# Patient Record
Sex: MALE | Race: WHITE | NOT HISPANIC OR LATINO | Employment: OTHER | URBAN - METROPOLITAN AREA
[De-identification: names, ages, dates, MRNs, and addresses within clinical notes are randomized per-mention and may not be internally consistent; named-entity substitution may affect disease eponyms.]

---

## 2018-01-10 ENCOUNTER — OFFICE VISIT (OUTPATIENT)
Dept: FAMILY MEDICINE | Facility: CLINIC | Age: 63
End: 2018-01-10
Attending: FAMILY MEDICINE
Payer: MEDICARE

## 2018-01-10 VITALS
WEIGHT: 167.19 LBS | DIASTOLIC BLOOD PRESSURE: 80 MMHG | SYSTOLIC BLOOD PRESSURE: 138 MMHG | HEART RATE: 80 BPM | BODY MASS INDEX: 24.76 KG/M2 | OXYGEN SATURATION: 98 % | HEIGHT: 69 IN

## 2018-01-10 DIAGNOSIS — Z12.11 SCREENING FOR COLORECTAL CANCER: ICD-10-CM

## 2018-01-10 DIAGNOSIS — Z12.12 SCREENING FOR COLORECTAL CANCER: ICD-10-CM

## 2018-01-10 DIAGNOSIS — M96.1 POST LAMINECTOMY SYNDROME: ICD-10-CM

## 2018-01-10 DIAGNOSIS — Z00.00 LABORATORY EXAM ORDERED AS PART OF ROUTINE GENERAL MEDICAL EXAMINATION: ICD-10-CM

## 2018-01-10 DIAGNOSIS — I10 ESSENTIAL (PRIMARY) HYPERTENSION: ICD-10-CM

## 2018-01-10 DIAGNOSIS — Z12.5 PROSTATE CANCER SCREENING: ICD-10-CM

## 2018-01-10 DIAGNOSIS — Z11.59 NEED FOR HEPATITIS C SCREENING TEST: ICD-10-CM

## 2018-01-10 DIAGNOSIS — F90.9 ATTENTION DEFICIT HYPERACTIVITY DISORDER (ADHD), UNSPECIFIED ADHD TYPE: ICD-10-CM

## 2018-01-10 DIAGNOSIS — Z00.00 ROUTINE GENERAL MEDICAL EXAMINATION AT A HEALTH CARE FACILITY: Primary | ICD-10-CM

## 2018-01-10 DIAGNOSIS — L40.50 PSORIATIC ARTHRITIS: ICD-10-CM

## 2018-01-10 PROCEDURE — 99203 OFFICE O/P NEW LOW 30 MIN: CPT | Mod: PBBFAC,PO | Performed by: FAMILY MEDICINE

## 2018-01-10 PROCEDURE — 99999 PR PBB SHADOW E&M-NEW PATIENT-LVL III: CPT | Mod: PBBFAC,,, | Performed by: FAMILY MEDICINE

## 2018-01-10 PROCEDURE — 99386 PREV VISIT NEW AGE 40-64: CPT | Mod: S$PBB,,, | Performed by: FAMILY MEDICINE

## 2018-01-10 RX ORDER — CYCLOBENZAPRINE HCL 10 MG
10 TABLET ORAL
COMMUNITY
End: 2018-05-23

## 2018-01-10 RX ORDER — MORPHINE SULFATE 30 MG/1
30 TABLET, FILM COATED, EXTENDED RELEASE ORAL 2 TIMES DAILY
COMMUNITY

## 2018-01-10 RX ORDER — MORPHINE SULFATE 15 MG/1
15 TABLET, FILM COATED, EXTENDED RELEASE ORAL 2 TIMES DAILY
COMMUNITY

## 2018-01-10 NOTE — PROGRESS NOTES
Subjective:       Patient ID: Jarrett Butler is a 62 y.o. male.    Chief Complaint: Establish Care    HPI     The patient presents today for first visit with me in more than 20 years.  He is requesting a routine periodic health examination.      Patient Active Problem List   Diagnosis    Post laminectomy syndrome    Hypertension    ADHD (attention deficit hyperactivity disorder)    Psoriatic arthritis       Past Surgical History:   Procedure Laterality Date    INGUINAL HERNIA REPAIR Right     LUMBAR DISCECTOMY  1998    lower back (2)    LUMBAR EPIDURAL INJECTION      multiple    STRABISMUS SURGERY  1961         Current Outpatient Prescriptions:     cyclobenzaprine (FLEXERIL) 10 MG tablet, Take 10 mg by mouth as needed for Muscle spasms., Disp: , Rfl:     LISINOPRIL-HYDROCHLOROTHIAZIDE ORAL, Take 20 mg by mouth once daily., Disp: , Rfl:     morphine (MS CONTIN) 15 MG 12 hr tablet, Take 15 mg by mouth 2 (two) times daily., Disp: , Rfl:     morphine (MS CONTIN) 30 MG 12 hr tablet, Take 30 mg by mouth 2 (two) times daily., Disp: , Rfl:     Review of patient's allergies indicates:   Allergen Reactions    Penicillins      Rash (skin)^       Family History   Problem Relation Age of Onset    Urinary tract infection Mother     Cirrhosis Father        Social History     Social History    Marital status:      Spouse name: N/A    Number of children: 2    Years of education: N/A     Occupational History          disabled     Social History Main Topics    Smoking status: Current Every Day Smoker     Packs/day: 1.00     Years: 47.00     Types: Cigarettes     Start date: 1/22/1969    Smokeless tobacco: Never Used    Alcohol use No      Comment: no EtOH since early 90's    Drug use: Unknown    Sexual activity: Not on file     Other Topics Concern    Not on file     Social History Narrative    The patient does not exercise regularly ().      He is satisfied with weight.    Rates  diet as fair.    He does not drink at least 1/2 gallon water daily.    He drinks 2-3 coffee/tea/caffeine-containing soft drinks daily.    Total sleep time at night is 6-8 hours.    He does wear seat belts.    Hobbies include music.           Patient Care Team:  Maikel Brewer Jr., MD as PCP - General (Family Medicine)  Saad Garcia MD as Consulting Physician (Pain Medicine)      Review of Systems   Constitutional: Negative for fatigue and unexpected weight change.   HENT: Negative for ear discharge, ear pain, hearing loss, tinnitus and voice change.    Eyes: Negative for pain.   Respiratory: Negative for cough and shortness of breath.    Cardiovascular: Negative for chest pain, palpitations and leg swelling.   Gastrointestinal: Positive for constipation (2/t opioids). Negative for abdominal pain, blood in stool, diarrhea, nausea and vomiting.   Genitourinary: Positive for difficulty urinating (semse opf incomplete bladder emptying) and frequency. Negative for decreased urine volume, dysuria, enuresis, hematuria and urgency.   Musculoskeletal: Negative for arthralgias, back pain and myalgias.   Skin: Negative for rash.   Neurological: Negative for dizziness, weakness, light-headedness and headaches.   Hematological: Does not bruise/bleed easily.   Psychiatric/Behavioral: Positive for sleep disturbance (difficulty with induction on occasion). Negative for dysphoric mood. The patient is not nervous/anxious.          Objective:      Physical Exam   Constitutional: He is oriented to person, place, and time. He appears well-developed and well-nourished. He is cooperative.   HENT:   Head: Normocephalic and atraumatic.   Right Ear: External ear normal.   Left Ear: External ear normal.   Nose: Nose normal.   Mouth/Throat: Oropharynx is clear and moist and mucous membranes are normal. Abnormal dentition (multiple missing teeth). Dental caries present. No oropharyngeal exudate.   Eyes: Conjunctivae are normal. No scleral  icterus.   Neck: Neck supple. No JVD present. Carotid bruit is not present. No thyromegaly present.   Cardiovascular: Normal rate, regular rhythm, normal heart sounds and normal pulses.  Exam reveals no gallop and no friction rub.    No murmur heard.  Pulmonary/Chest: Effort normal and breath sounds normal. He has no wheezes. He has no rhonchi. He has no rales.   Abdominal: Soft. Bowel sounds are normal. He exhibits no distension and no mass. There is no splenomegaly or hepatomegaly. There is no tenderness.   Musculoskeletal: Normal range of motion. He exhibits no edema or tenderness.   Lymphadenopathy:     He has no cervical adenopathy.     He has no axillary adenopathy.   Neurological: He is alert and oriented to person, place, and time. He has normal strength and normal reflexes. No cranial nerve deficit or sensory deficit. Coordination normal.   Skin: Skin is warm and dry.   Psychiatric: He has a normal mood and affect.   Vitals reviewed.        Assessment:       1. Routine general medical examination at a health care facility    2. Essential (primary) hypertension     3. Attention deficit hyperactivity disorder (ADHD), unspecified ADHD type    4. Post laminectomy syndrome    5. Psoriatic arthritis    6. Need for hepatitis C screening test    7. Screening for colorectal cancer    8. Prostate cancer screening    9. Laboratory exam ordered as part of routine general medical examination        Plan:       Laboratory investigation, including diabetes and thyroid screening, serum chemistries, and lipid profile.  Discussed with patient the importance of lifestyle modifications, including well-balanced diet and moderate exercise regimen, in reducing risk for cardiovascular/cerebrovascular disease and diabetes.  Discussed routine examinations and screenings.   Declines all vaccines.   Agrees to FOBT.  Patient will continue to receive opioid Rxs from his pain management specialist in [place].    Assistance with smoking  "cessation was offered, including:  []  Medications  []  Counseling  [x]  Printed Information on Smoking Cessation  [x]  Referral to a Smoking Cessation Program    Patient was counseled regarding smoking for 3-10 minutes.    We will call the patient with results & make further recommendations at that time.                "This note will not be shared with the patient."  "

## 2018-01-10 NOTE — PATIENT INSTRUCTIONS
"    INSTRUCTIONS FOR [facility]    1.  [place] online at "[email or web address]"  OR       Call smoking cessation trust at [phone] or toll free at [phone].       They will register you and send you a special Trust Card    2.  After receiving the card, call [phone] to schedule an appointment with the Smoking Cessation Clinic.    "

## 2018-01-15 ENCOUNTER — LAB VISIT (OUTPATIENT)
Dept: LAB | Facility: HOSPITAL | Age: 63
End: 2018-01-15
Attending: FAMILY MEDICINE
Payer: MEDICARE

## 2018-01-15 ENCOUNTER — PATIENT MESSAGE (OUTPATIENT)
Dept: FAMILY MEDICINE | Facility: CLINIC | Age: 63
End: 2018-01-15

## 2018-01-15 DIAGNOSIS — R73.01 ELEVATED FASTING GLUCOSE: ICD-10-CM

## 2018-01-15 DIAGNOSIS — F90.9 ATTENTION DEFICIT HYPERACTIVITY DISORDER (ADHD), UNSPECIFIED ADHD TYPE: ICD-10-CM

## 2018-01-15 DIAGNOSIS — Z12.5 PROSTATE CANCER SCREENING: ICD-10-CM

## 2018-01-15 DIAGNOSIS — Z11.59 NEED FOR HEPATITIS C SCREENING TEST: ICD-10-CM

## 2018-01-15 DIAGNOSIS — I10 ESSENTIAL (PRIMARY) HYPERTENSION: ICD-10-CM

## 2018-01-15 DIAGNOSIS — Z00.00 LABORATORY EXAM ORDERED AS PART OF ROUTINE GENERAL MEDICAL EXAMINATION: ICD-10-CM

## 2018-01-15 LAB
ALBUMIN SERPL BCP-MCNC: 4.2 G/DL
ALP SERPL-CCNC: 44 U/L
ALT SERPL W/O P-5'-P-CCNC: 17 U/L
ANION GAP SERPL CALC-SCNC: 11 MMOL/L
AST SERPL-CCNC: 20 U/L
BILIRUB SERPL-MCNC: 0.9 MG/DL
BUN SERPL-MCNC: 21 MG/DL
CALCIUM SERPL-MCNC: 10.2 MG/DL
CHLORIDE SERPL-SCNC: 96 MMOL/L
CHOLEST SERPL-MCNC: 183 MG/DL
CHOLEST/HDLC SERPL: 4.9 {RATIO}
CO2 SERPL-SCNC: 29 MMOL/L
COMPLEXED PSA SERPL-MCNC: 1.2 NG/ML
CREAT SERPL-MCNC: 1.2 MG/DL
EST. GFR  (AFRICAN AMERICAN): >60 ML/MIN/1.73 M^2
EST. GFR  (NON AFRICAN AMERICAN): >60 ML/MIN/1.73 M^2
GLUCOSE SERPL-MCNC: 124 MG/DL
HCV AB SERPL QL IA: NEGATIVE
HDLC SERPL-MCNC: 37 MG/DL
HDLC SERPL: 20.2 %
LDLC SERPL CALC-MCNC: 122.2 MG/DL
NONHDLC SERPL-MCNC: 146 MG/DL
POTASSIUM SERPL-SCNC: 4.8 MMOL/L
PROT SERPL-MCNC: 7.5 G/DL
SODIUM SERPL-SCNC: 136 MMOL/L
TRIGL SERPL-MCNC: 119 MG/DL
TSH SERPL DL<=0.005 MIU/L-ACNC: 2.19 UIU/ML

## 2018-01-15 PROCEDURE — 86803 HEPATITIS C AB TEST: CPT

## 2018-01-15 PROCEDURE — 84443 ASSAY THYROID STIM HORMONE: CPT

## 2018-01-15 PROCEDURE — 80061 LIPID PANEL: CPT

## 2018-01-15 PROCEDURE — 36415 COLL VENOUS BLD VENIPUNCTURE: CPT | Mod: PO

## 2018-01-15 PROCEDURE — 80053 COMPREHEN METABOLIC PANEL: CPT

## 2018-01-15 PROCEDURE — 84153 ASSAY OF PSA TOTAL: CPT

## 2018-04-26 ENCOUNTER — PATIENT OUTREACH (OUTPATIENT)
Dept: ADMINISTRATIVE | Facility: HOSPITAL | Age: 63
End: 2018-04-26

## 2018-04-26 NOTE — PROGRESS NOTES
Contacted patient in efforts to encourage the completion and return of fitkit ( colon/rectal screening test) on 4/26/18. Will f/u at a later time.   LEFT MESSAGE FOR PATIENT TO CONTACT OFFICE. WILL MAIL LETTER IN EFFORTS TO REACH OUT TO PATIENT.

## 2018-04-26 NOTE — PROGRESS NOTES
Dear Jarrett Skaggs:    Your [facility] Team wants to make sure we help you prevent illness and make the healthiest choices possible.    Our records show you are due for colon cancer screening.  If you have already had your screening, or you have made an appointment for your screening, congratulations!  Youre on the road to good health. If you havent signed up for a colorectal screening please accept this invitation to be screened.      According to the American Cancer Society, colon cancer is the third most common cancer for people in the [place].  A simple screening test Fit Kit - done in your own home - can help find colon cancer at an early stage when it can be treated, even before any signs or symptoms develop.     Testing for blood in your stool (feces or bowel movement) is the first step. If you have an upcoming visit with your Primary Care Physician you can  a Fit Kit during your visit, or you can  a Fit Kit at your Primary Care Clinic prior to your visit.    The Fit Kit includes:     Instructions on how to perform the test   (1) Sheet of tissue paper   (1) Small Absorption pad   (1) Bottle to hold the sample and a small probe to help you take the sample   (1) Small plastic bio-hazard bag   (1) Postage-paid return envelope    Please do your test (the instructions will tell you how) and then return your sample in the postage-paid return envelope within 24 hours of collection.     A friendly reminder to please complete and mail in your fitkit for your colon rectal cancer screening. The kit was given to you on 1/10/18. If you have  misplaced kit or need instructions on completing  please contact Maikel Brewer Jr, MD office to obtain. Thank you for  Choosing [facility] for your Healthcare needs. Have a great day.     If your test results are negative, you wont need testing again for another year.  If results show you need more testing, we will call you with next  steps.    Regular colorectal cancer screening is one of the most powerful weapons for preventing colon cancer.  The website https://www.cancer.org/cancer/colon-rectal-cancer.html can answer many of your questions about this cancer and its prevention.  Just search for colorectal cancer.    I wish you continued good health!

## 2018-05-22 ENCOUNTER — PATIENT OUTREACH (OUTPATIENT)
Dept: ADMINISTRATIVE | Facility: HOSPITAL | Age: 63
End: 2018-05-22

## 2018-05-22 NOTE — PROGRESS NOTES
Dear Jarrett Butler        A friendly reminder to please complete and mail in your fitkit for your colon rectal cancer screening. The kit was given to you on 1/10/18. If you have  misplaced kit or need instructions on completing  please contact Maikel Brewer Jr, MD office to obtain. Thank you for  Choosing [facility] for your Healthcare needs. Have a great day.

## 2018-05-22 NOTE — PROGRESS NOTES
Spoke with patient regarding completing of colon rectal screening fit kit. He informed that he has been going through and a lot and simply forgets and that it is sitting on his desk. Understanding verbalized. Confirmed scheduled appointment on tomorrow.  Encourage completion of fit kit at his convenience did not have any questions or concerns at this time.

## 2018-05-23 ENCOUNTER — OFFICE VISIT (OUTPATIENT)
Dept: FAMILY MEDICINE | Facility: CLINIC | Age: 63
End: 2018-05-23
Attending: FAMILY MEDICINE
Payer: MEDICARE

## 2018-05-23 ENCOUNTER — LAB VISIT (OUTPATIENT)
Dept: LAB | Facility: HOSPITAL | Age: 63
End: 2018-05-23
Attending: FAMILY MEDICINE
Payer: MEDICARE

## 2018-05-23 ENCOUNTER — TELEPHONE (OUTPATIENT)
Dept: FAMILY MEDICINE | Facility: CLINIC | Age: 63
End: 2018-05-23

## 2018-05-23 VITALS
HEIGHT: 69 IN | SYSTOLIC BLOOD PRESSURE: 136 MMHG | WEIGHT: 151.13 LBS | DIASTOLIC BLOOD PRESSURE: 70 MMHG | OXYGEN SATURATION: 97 % | BODY MASS INDEX: 22.38 KG/M2 | HEART RATE: 63 BPM

## 2018-05-23 DIAGNOSIS — M96.1 POST LAMINECTOMY SYNDROME: ICD-10-CM

## 2018-05-23 DIAGNOSIS — I10 HYPERTENSION, UNSPECIFIED TYPE: ICD-10-CM

## 2018-05-23 DIAGNOSIS — R73.01 ELEVATED FASTING GLUCOSE: ICD-10-CM

## 2018-05-23 DIAGNOSIS — K59.03 THERAPEUTIC OPIOID INDUCED CONSTIPATION: ICD-10-CM

## 2018-05-23 DIAGNOSIS — F41.0 GENERALIZED ANXIETY DISORDER WITH PANIC ATTACKS: Primary | ICD-10-CM

## 2018-05-23 DIAGNOSIS — F41.1 GENERALIZED ANXIETY DISORDER WITH PANIC ATTACKS: Primary | ICD-10-CM

## 2018-05-23 DIAGNOSIS — T40.2X5A THERAPEUTIC OPIOID INDUCED CONSTIPATION: ICD-10-CM

## 2018-05-23 LAB
ANION GAP SERPL CALC-SCNC: 10 MMOL/L
BUN SERPL-MCNC: 8 MG/DL
CALCIUM SERPL-MCNC: 9.9 MG/DL
CHLORIDE SERPL-SCNC: 101 MMOL/L
CO2 SERPL-SCNC: 25 MMOL/L
CREAT SERPL-MCNC: 0.9 MG/DL
EST. GFR  (AFRICAN AMERICAN): >60 ML/MIN/1.73 M^2
EST. GFR  (NON AFRICAN AMERICAN): >60 ML/MIN/1.73 M^2
ESTIMATED AVG GLUCOSE: 108 MG/DL
GLUCOSE SERPL-MCNC: 99 MG/DL
HBA1C MFR BLD HPLC: 5.4 %
POTASSIUM SERPL-SCNC: 4.4 MMOL/L
SODIUM SERPL-SCNC: 136 MMOL/L

## 2018-05-23 PROCEDURE — 82947 ASSAY GLUCOSE BLOOD QUANT: CPT

## 2018-05-23 PROCEDURE — 83036 HEMOGLOBIN GLYCOSYLATED A1C: CPT

## 2018-05-23 PROCEDURE — 99999 PR PBB SHADOW E&M-EST. PATIENT-LVL III: CPT | Mod: PBBFAC,,, | Performed by: FAMILY MEDICINE

## 2018-05-23 PROCEDURE — 36415 COLL VENOUS BLD VENIPUNCTURE: CPT | Mod: PO

## 2018-05-23 PROCEDURE — 99214 OFFICE O/P EST MOD 30 MIN: CPT | Mod: S$PBB,,, | Performed by: FAMILY MEDICINE

## 2018-05-23 PROCEDURE — 99213 OFFICE O/P EST LOW 20 MIN: CPT | Mod: PBBFAC,PO | Performed by: FAMILY MEDICINE

## 2018-05-23 PROCEDURE — 80048 BASIC METABOLIC PNL TOTAL CA: CPT

## 2018-05-23 RX ORDER — ALPRAZOLAM 0.25 MG/1
0.25 TABLET ORAL 3 TIMES DAILY PRN
Qty: 10 TABLET | Refills: 0 | Status: SHIPPED | OUTPATIENT
Start: 2018-05-23 | End: 2018-06-14 | Stop reason: SDUPTHER

## 2018-05-23 RX ORDER — CITALOPRAM 10 MG/1
10 TABLET ORAL DAILY
Qty: 30 TABLET | Refills: 0 | Status: SHIPPED | OUTPATIENT
Start: 2018-05-23 | End: 2018-06-22 | Stop reason: SDUPTHER

## 2018-05-23 RX ORDER — LISINOPRIL AND HYDROCHLOROTHIAZIDE 12.5; 2 MG/1; MG/1
1 TABLET ORAL DAILY
Qty: 90 TABLET | Refills: 3 | Status: SHIPPED | OUTPATIENT
Start: 2018-05-23 | End: 2019-05-23

## 2018-05-23 NOTE — TELEPHONE ENCOUNTER
----- Message from Cheryl Dennison sent at 5/23/2018  3:54 PM CDT -----            Name of Who is Calling: Gwen([facility])      What is the request in detail: Rep states a Xanax rx was called in for the pt and she wants to know if he can take that with the morphine that he's on that was prescribed by another doctor. Please call to discuss.      Can the clinic reply by [facility]: no      What Number to Call Back if not in [facility]: [phone]

## 2018-05-23 NOTE — TELEPHONE ENCOUNTER
Spoke with the Gwen to inform her that Dr. Brewer prescribed the Xanax 0.25 mg as a trial. Dr. Brewer is aware of the medications the patient is on.

## 2018-05-24 LAB — GLUCOSE SERPL-MCNC: 99 MG/DL

## 2018-05-26 ENCOUNTER — PATIENT MESSAGE (OUTPATIENT)
Dept: FAMILY MEDICINE | Facility: CLINIC | Age: 63
End: 2018-05-26

## 2018-05-30 ENCOUNTER — LAB VISIT (OUTPATIENT)
Dept: LAB | Facility: HOSPITAL | Age: 63
End: 2018-05-30
Attending: FAMILY MEDICINE
Payer: MEDICARE

## 2018-05-30 ENCOUNTER — OFFICE VISIT (OUTPATIENT)
Dept: FAMILY MEDICINE | Facility: CLINIC | Age: 63
End: 2018-05-30
Attending: FAMILY MEDICINE
Payer: MEDICARE

## 2018-05-30 VITALS
SYSTOLIC BLOOD PRESSURE: 130 MMHG | HEART RATE: 77 BPM | BODY MASS INDEX: 22.02 KG/M2 | DIASTOLIC BLOOD PRESSURE: 70 MMHG | WEIGHT: 148.69 LBS | OXYGEN SATURATION: 97 % | HEIGHT: 69 IN

## 2018-05-30 DIAGNOSIS — T40.2X5A THERAPEUTIC OPIOID-INDUCED CONSTIPATION (OIC): ICD-10-CM

## 2018-05-30 DIAGNOSIS — E64.0 SEQUELAE OF PROTEIN-CALORIE MALNUTRITION: ICD-10-CM

## 2018-05-30 DIAGNOSIS — E55.9 VITAMIN D DEFICIENCY: ICD-10-CM

## 2018-05-30 DIAGNOSIS — F41.1 GENERALIZED ANXIETY DISORDER WITH PANIC ATTACKS: ICD-10-CM

## 2018-05-30 DIAGNOSIS — F41.0 GENERALIZED ANXIETY DISORDER WITH PANIC ATTACKS: ICD-10-CM

## 2018-05-30 DIAGNOSIS — K59.03 THERAPEUTIC OPIOID-INDUCED CONSTIPATION (OIC): ICD-10-CM

## 2018-05-30 DIAGNOSIS — R63.4 WEIGHT LOSS: ICD-10-CM

## 2018-05-30 DIAGNOSIS — R63.4 WEIGHT LOSS: Primary | ICD-10-CM

## 2018-05-30 LAB
25(OH)D3+25(OH)D2 SERPL-MCNC: 16 NG/ML
ALBUMIN SERPL BCP-MCNC: 4.7 G/DL
ALP SERPL-CCNC: 40 U/L
ALT SERPL W/O P-5'-P-CCNC: 10 U/L
ANION GAP SERPL CALC-SCNC: 9 MMOL/L
AST SERPL-CCNC: 15 U/L
BASOPHILS # BLD AUTO: 0.03 K/UL
BASOPHILS NFR BLD: 0.3 %
BILIRUB SERPL-MCNC: 1.2 MG/DL
BUN SERPL-MCNC: 14 MG/DL
CALCIUM SERPL-MCNC: 10.1 MG/DL
CHLORIDE SERPL-SCNC: 101 MMOL/L
CO2 SERPL-SCNC: 28 MMOL/L
CREAT SERPL-MCNC: 1 MG/DL
DIFFERENTIAL METHOD: ABNORMAL
EOSINOPHIL # BLD AUTO: 0 K/UL
EOSINOPHIL NFR BLD: 0.4 %
ERYTHROCYTE [DISTWIDTH] IN BLOOD BY AUTOMATED COUNT: 12.5 %
EST. GFR  (AFRICAN AMERICAN): >60 ML/MIN/1.73 M^2
EST. GFR  (NON AFRICAN AMERICAN): >60 ML/MIN/1.73 M^2
GLUCOSE SERPL-MCNC: 103 MG/DL
HCT VFR BLD AUTO: 48.5 %
HGB BLD-MCNC: 16.4 G/DL
IMM GRANULOCYTES # BLD AUTO: 0.02 K/UL
IMM GRANULOCYTES NFR BLD AUTO: 0.2 %
INR PPP: 1.1
LYMPHOCYTES # BLD AUTO: 1.2 K/UL
LYMPHOCYTES NFR BLD: 13.5 %
MCH RBC QN AUTO: 29.7 PG
MCHC RBC AUTO-ENTMCNC: 33.8 G/DL
MCV RBC AUTO: 88 FL
MONOCYTES # BLD AUTO: 0.6 K/UL
MONOCYTES NFR BLD: 6.4 %
NEUTROPHILS # BLD AUTO: 7.2 K/UL
NEUTROPHILS NFR BLD: 79.2 %
NRBC BLD-RTO: 0 /100 WBC
PLATELET # BLD AUTO: 260 K/UL
PMV BLD AUTO: 9.7 FL
POTASSIUM SERPL-SCNC: 4.9 MMOL/L
PREALB SERPL-MCNC: 30 MG/DL
PROT SERPL-MCNC: 7.5 G/DL
PROTHROMBIN TIME: 11.1 SEC
RBC # BLD AUTO: 5.52 M/UL
SODIUM SERPL-SCNC: 138 MMOL/L
T4 FREE SERPL-MCNC: 1.27 NG/DL
TSH SERPL DL<=0.005 MIU/L-ACNC: 1.14 UIU/ML
WBC # BLD AUTO: 9.11 K/UL

## 2018-05-30 PROCEDURE — 80053 COMPREHEN METABOLIC PANEL: CPT

## 2018-05-30 PROCEDURE — 84439 ASSAY OF FREE THYROXINE: CPT

## 2018-05-30 PROCEDURE — 99213 OFFICE O/P EST LOW 20 MIN: CPT | Mod: PBBFAC,PO | Performed by: FAMILY MEDICINE

## 2018-05-30 PROCEDURE — 85025 COMPLETE CBC W/AUTO DIFF WBC: CPT

## 2018-05-30 PROCEDURE — 99999 PR PBB SHADOW E&M-EST. PATIENT-LVL III: CPT | Mod: PBBFAC,,, | Performed by: FAMILY MEDICINE

## 2018-05-30 PROCEDURE — 84597 ASSAY OF VITAMIN K: CPT

## 2018-05-30 PROCEDURE — 85610 PROTHROMBIN TIME: CPT

## 2018-05-30 PROCEDURE — 84134 ASSAY OF PREALBUMIN: CPT

## 2018-05-30 PROCEDURE — 82306 VITAMIN D 25 HYDROXY: CPT

## 2018-05-30 PROCEDURE — 84443 ASSAY THYROID STIM HORMONE: CPT

## 2018-05-30 PROCEDURE — 84590 ASSAY OF VITAMIN A: CPT

## 2018-05-30 PROCEDURE — 99214 OFFICE O/P EST MOD 30 MIN: CPT | Mod: S$PBB,,, | Performed by: FAMILY MEDICINE

## 2018-05-30 PROCEDURE — 84446 ASSAY OF VITAMIN E: CPT

## 2018-05-30 PROCEDURE — 36415 COLL VENOUS BLD VENIPUNCTURE: CPT | Mod: PO

## 2018-05-30 NOTE — PROGRESS NOTES
Subjective:       Patient ID: Jarrett Butler is a 63 y.o. male.    Chief Complaint: GI Problem    GI Problem   The primary symptoms include weight loss, nausea and diarrhea (soft, semiformed). Primary symptoms do not include vomiting, melena, jaundice, hematochezia, myalgias or arthralgias. Episode onset: 3-4 weeks ago. The onset was gradual. The problem has not changed since onset.  He has lost 5 to10 kg (over past 4 months). There has been a change in the fit of his clothing.   The illness is also significant for constipation. The illness does not include chills. Associated medical issues do not include gallstones, gastric bypass, bowel resection or diverticulitis.       Patient Active Problem List   Diagnosis    Post laminectomy syndrome    Hypertension    ADHD (attention deficit hyperactivity disorder)    Psoriatic arthritis    Elevated fasting glucose    Generalized anxiety disorder with panic attacks    Therapeutic opioid induced constipation         Current Outpatient Prescriptions:     ALPRAZolam (XANAX) 0.25 MG tablet, Take 1 tablet (0.25 mg total) by mouth 3 (three) times daily as needed for Anxiety., Disp: 10 tablet, Rfl: 0    citalopram (CELEXA) 10 MG tablet, Take 1 tablet (10 mg total) by mouth once daily., Disp: 30 tablet, Rfl: 0    lisinopril-hydrochlorothiazide (PRINZIDE,ZESTORETIC) 20-12.5 mg per tablet, Take 1 tablet by mouth once daily., Disp: 90 tablet, Rfl: 3    morphine (MS CONTIN) 15 MG 12 hr tablet, Take 15 mg by mouth 2 (two) times daily., Disp: , Rfl:     morphine (MS CONTIN) 30 MG 12 hr tablet, Take 30 mg by mouth 2 (two) times daily., Disp: , Rfl:     The following portions of the patient's history were reviewed and updated as appropriate: allergies, past family history, past medical history, past social history and past surgical history    Review of Systems   Constitutional: Positive for weight loss. Negative for chills.   Gastrointestinal: Positive for constipation,  "diarrhea (soft, semiformed) and nausea. Negative for hematochezia, jaundice, melena and vomiting.   Musculoskeletal: Negative for arthralgias and myalgias.       Wife reports no improvement with anxiety.  Still awaiting word on EAP.    Objective:      Physical Exam   Constitutional: He is oriented to person, place, and time. He appears cachectic.   HENT:   Head: Normocephalic and atraumatic.   Mouth/Throat: Oropharynx is clear and moist.   Eyes: Conjunctivae are normal. No scleral icterus.   Cardiovascular: Normal rate, regular rhythm and normal heart sounds.  Exam reveals no gallop.    No murmur heard.  Pulmonary/Chest: Effort normal and breath sounds normal. He has no wheezes. He has no rales.   Abdominal: Soft. Bowel sounds are decreased. There is no tenderness.   Neurological: He is alert and oriented to person, place, and time.   Skin: Skin is warm and dry.   Psychiatric: He has a normal mood and affect.         Assessment:       1. Weight loss    2. Generalized anxiety disorder with panic attacks    3. Therapeutic opioid-induced constipation (OIC)          Plan:       Flat/erect abd film.  Labs (w/nutritional markers).  Consider [facility] day program ([facility] if EAP falls through).        "This note will not be shared with the patient."  "

## 2018-05-31 ENCOUNTER — PATIENT MESSAGE (OUTPATIENT)
Dept: FAMILY MEDICINE | Facility: CLINIC | Age: 63
End: 2018-05-31

## 2018-06-02 LAB
A-TOCOPHEROL VIT E SERPL-MCNC: 1022 UG/DL (ref 500–1800)
VIT A SERPL-MCNC: 54 UG/DL (ref 38–106)

## 2018-06-03 LAB — PHYTONADIONE SERPL-MCNC: 0.48 NMOL/L (ref 0.22–4.88)

## 2018-06-04 ENCOUNTER — PATIENT MESSAGE (OUTPATIENT)
Dept: FAMILY MEDICINE | Facility: CLINIC | Age: 63
End: 2018-06-04

## 2018-06-06 ENCOUNTER — PATIENT MESSAGE (OUTPATIENT)
Dept: FAMILY MEDICINE | Facility: CLINIC | Age: 63
End: 2018-06-06

## 2018-06-06 RX ORDER — VIT C/E/ZN/COPPR/LUTEIN/ZEAXAN 250MG-90MG
1000 CAPSULE ORAL DAILY
Refills: 0 | COMMUNITY
Start: 2018-06-06

## 2018-06-06 RX ORDER — ERGOCALCIFEROL 1.25 MG/1
50000 CAPSULE ORAL WEEKLY
Qty: 4 CAPSULE | Refills: 2 | Status: SHIPPED | OUTPATIENT
Start: 2018-06-06 | End: 2018-09-04

## 2018-06-14 ENCOUNTER — OFFICE VISIT (OUTPATIENT)
Dept: FAMILY MEDICINE | Facility: CLINIC | Age: 63
End: 2018-06-14
Attending: FAMILY MEDICINE
Payer: MEDICARE

## 2018-06-14 ENCOUNTER — DOCUMENTATION ONLY (OUTPATIENT)
Dept: PSYCHIATRY | Facility: HOSPITAL | Age: 63
End: 2018-06-14

## 2018-06-14 VITALS
HEIGHT: 69 IN | WEIGHT: 146.31 LBS | DIASTOLIC BLOOD PRESSURE: 60 MMHG | SYSTOLIC BLOOD PRESSURE: 120 MMHG | OXYGEN SATURATION: 97 % | HEART RATE: 63 BPM | BODY MASS INDEX: 21.67 KG/M2

## 2018-06-14 DIAGNOSIS — F41.0 GENERALIZED ANXIETY DISORDER WITH PANIC ATTACKS: Primary | ICD-10-CM

## 2018-06-14 DIAGNOSIS — F32.2 CURRENT SEVERE EPISODE OF MAJOR DEPRESSIVE DISORDER WITHOUT PSYCHOTIC FEATURES WITHOUT PRIOR EPISODE: ICD-10-CM

## 2018-06-14 DIAGNOSIS — F41.1 GENERALIZED ANXIETY DISORDER WITH PANIC ATTACKS: Primary | ICD-10-CM

## 2018-06-14 PROCEDURE — 99213 OFFICE O/P EST LOW 20 MIN: CPT | Mod: PBBFAC,PO | Performed by: FAMILY MEDICINE

## 2018-06-14 PROCEDURE — 99999 PR PBB SHADOW E&M-EST. PATIENT-LVL III: CPT | Mod: PBBFAC,,, | Performed by: FAMILY MEDICINE

## 2018-06-14 PROCEDURE — 99213 OFFICE O/P EST LOW 20 MIN: CPT | Mod: S$PBB,,, | Performed by: FAMILY MEDICINE

## 2018-06-14 RX ORDER — ALPRAZOLAM 0.25 MG/1
0.25 TABLET ORAL 3 TIMES DAILY PRN
Qty: 10 TABLET | Refills: 0 | Status: SHIPPED | OUTPATIENT
Start: 2018-06-14 | End: 2018-07-14

## 2018-06-14 NOTE — PROGRESS NOTES
"Subjective:       Patient ID: Jarrett Butler is a 63 y.o. male.    Chief Complaint: Panic Attack    HPI     The patient presents again with his wife.  He had a panic attack yesterday, which was incapacitating.  He is seeing a counselor (CBT); 3 sessions so far.  He has lost another 2 lbs since last week.  He told his wife this morning that he doesn't feel "in control of my brain."  He admits to suicidal thoughts without ideations.    Wife states that her first  (and patient's best friend) committed suicide.        Patient Active Problem List   Diagnosis    Post laminectomy syndrome    Hypertension    ADHD (attention deficit hyperactivity disorder)    Psoriatic arthritis    Elevated fasting glucose    Generalized anxiety disorder with panic attacks    Therapeutic opioid induced constipation    Current severe episode of major depressive disorder without prior episode       Current Outpatient Prescriptions:     ALPRAZolam (XANAX) 0.25 MG tablet, Take 1 tablet (0.25 mg total) by mouth 3 (three) times daily as needed for Anxiety., Disp: 10 tablet, Rfl: 0    cholecalciferol, vitamin D3, 1,000 unit capsule, Take 1 capsule (1,000 Units total) by mouth once daily., Disp: , Rfl: 0    citalopram (CELEXA) 10 MG tablet, Take 1 tablet (10 mg total) by mouth once daily., Disp: 30 tablet, Rfl: 0    ergocalciferol (ERGOCALCIFEROL) 50,000 unit Cap, Take 1 capsule (50,000 Units total) by mouth once a week., Disp: 4 capsule, Rfl: 2    lisinopril-hydrochlorothiazide (PRINZIDE,ZESTORETIC) 20-12.5 mg per tablet, Take 1 tablet by mouth once daily., Disp: 90 tablet, Rfl: 3    morphine (MS CONTIN) 15 MG 12 hr tablet, Take 15 mg by mouth 2 (two) times daily., Disp: , Rfl:     morphine (MS CONTIN) 30 MG 12 hr tablet, Take 30 mg by mouth 2 (two) times daily., Disp: , Rfl:     The following portions of the patient's history were reviewed and updated as appropriate: allergies, past family history, past medical " "history, past social history and past surgical history    Review of Systems    Other than history of present illness, noncontributory.    Objective:      Physical Exam    /60 (BP Location: Left arm, Patient Position: Sitting, BP Method: Small (Manual))   Pulse 63   Ht 5' 9" (1.753 m)   Wt 66.4 kg (146 lb 4.8 oz)   SpO2 97%   BMI 21.60 kg/m²      The entirety of today's visit was devoted to counseling.  The visit lasted 20 minutes.    Assessment:       1. Generalized anxiety disorder with panic attacks    2. 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  Agree to psychiatric evaluation.  Will arrange for evaluation in day program at [facility].    "This note will not be shared with the patient."  "

## 2018-06-14 NOTE — PSYCH
Pt called to inquire about BMU program. Pt expressed ambivalence to group therapy and requested to see psychiatrist. SW provided phone number to out pt psych. Pt noted phone number.

## 2018-06-18 ENCOUNTER — DOCUMENTATION ONLY (OUTPATIENT)
Dept: PSYCHIATRY | Facility: HOSPITAL | Age: 63
End: 2018-06-18

## 2018-06-18 NOTE — PSYCH
[employer] contacted pt to review insurance benefits and schedule BMU start date. Pt wanted to pend scheduling start date for BMU indicating need to discuss insurance coverage and payment with spouse. Pt expressed understanding of coverage and elected to hold off BMU scheduling for now. Pt reported that he will contact [employer] back.

## 2018-06-21 ENCOUNTER — PATIENT MESSAGE (OUTPATIENT)
Dept: FAMILY MEDICINE | Facility: CLINIC | Age: 63
End: 2018-06-21

## 2018-06-22 ENCOUNTER — PATIENT MESSAGE (OUTPATIENT)
Dept: FAMILY MEDICINE | Facility: CLINIC | Age: 63
End: 2018-06-22

## 2018-06-22 RX ORDER — CITALOPRAM 20 MG/1
20 TABLET, FILM COATED ORAL DAILY
Qty: 30 TABLET | Refills: 0 | Status: SHIPPED | OUTPATIENT
Start: 2018-06-22

## 2018-08-13 ENCOUNTER — PATIENT OUTREACH (OUTPATIENT)
Dept: ADMINISTRATIVE | Facility: HOSPITAL | Age: 63
End: 2018-08-13

## 2018-08-13 ENCOUNTER — PATIENT MESSAGE (OUTPATIENT)
Dept: ADMINISTRATIVE | Facility: HOSPITAL | Age: 63
End: 2018-08-13

## 2018-08-13 NOTE — PROGRESS NOTES
Dear Jarrett Butler        A friendly reminder to please complete and mail in your fitkit for your colon rectal cancer screening. The kit was given to you on 1.10.18. If you have misplaced the kit or need instructions on completing  please contact Maikel Brewer Jr, MD office to obtain assistance.  Please return kit to your nearest [facility] facility once completed. You also have the option to mail in the kit once completed. Thank you for  Choosing [facility] for your Healthcare needs. Have a great day.

## 2019-03-15 DIAGNOSIS — Z12.11 COLON CANCER SCREENING: ICD-10-CM

## 2020-05-14 DIAGNOSIS — Z12.11 COLON CANCER SCREENING: ICD-10-CM

## 2020-09-29 ENCOUNTER — PATIENT MESSAGE (OUTPATIENT)
Dept: OTHER | Facility: OTHER | Age: 65
End: 2020-09-29

## 2020-10-05 ENCOUNTER — PATIENT MESSAGE (OUTPATIENT)
Dept: INTERNAL MEDICINE | Facility: CLINIC | Age: 65
End: 2020-10-05

## 2020-12-11 ENCOUNTER — PATIENT MESSAGE (OUTPATIENT)
Dept: OTHER | Facility: OTHER | Age: 65
End: 2020-12-11

## 2021-01-04 ENCOUNTER — PATIENT MESSAGE (OUTPATIENT)
Dept: ADMINISTRATIVE | Facility: HOSPITAL | Age: 66
End: 2021-01-04

## 2021-03-12 ENCOUNTER — TELEPHONE (OUTPATIENT)
Dept: INTERNAL MEDICINE | Facility: CLINIC | Age: 66
End: 2021-03-12

## 2021-04-05 ENCOUNTER — PATIENT MESSAGE (OUTPATIENT)
Dept: ADMINISTRATIVE | Facility: HOSPITAL | Age: 66
End: 2021-04-05

## 2021-04-26 ENCOUNTER — PATIENT MESSAGE (OUTPATIENT)
Dept: RESEARCH | Facility: HOSPITAL | Age: 66
End: 2021-04-26

## 2025-01-06 ENCOUNTER — OFFICE VISIT (OUTPATIENT)
Dept: PODIATRY | Facility: CLINIC | Age: 70
End: 2025-01-06
Payer: MEDICARE

## 2025-01-06 VITALS
HEIGHT: 69 IN | HEART RATE: 82 BPM | WEIGHT: 145.5 LBS | BODY MASS INDEX: 21.55 KG/M2 | SYSTOLIC BLOOD PRESSURE: 165 MMHG | DIASTOLIC BLOOD PRESSURE: 78 MMHG

## 2025-01-06 DIAGNOSIS — M79.672 PAIN IN BOTH FEET: ICD-10-CM

## 2025-01-06 DIAGNOSIS — L40.50 PSORIATIC ARTHRITIS: Primary | ICD-10-CM

## 2025-01-06 DIAGNOSIS — L84 CORN OR CALLUS: ICD-10-CM

## 2025-01-06 DIAGNOSIS — B35.1 ONYCHOMYCOSIS: ICD-10-CM

## 2025-01-06 DIAGNOSIS — L60.9 DISEASE OF NAIL: ICD-10-CM

## 2025-01-06 DIAGNOSIS — M79.671 PAIN IN BOTH FEET: ICD-10-CM

## 2025-01-06 DIAGNOSIS — I73.9 PAD (PERIPHERAL ARTERY DISEASE): ICD-10-CM

## 2025-01-06 PROCEDURE — 99203 OFFICE O/P NEW LOW 30 MIN: CPT | Mod: PBBFAC,25 | Performed by: PODIATRIST

## 2025-01-06 PROCEDURE — 11056 PARNG/CUTG B9 HYPRKR LES 2-4: CPT | Mod: PBBFAC | Performed by: PODIATRIST

## 2025-01-06 PROCEDURE — 11730 AVULSION NAIL PLATE SIMPLE 1: CPT | Mod: PBBFAC | Performed by: PODIATRIST

## 2025-01-06 PROCEDURE — 11721 DEBRIDE NAIL 6 OR MORE: CPT | Mod: PBBFAC | Performed by: PODIATRIST

## 2025-01-06 PROCEDURE — 99999 PR PBB SHADOW E&M-NEW PATIENT-LVL III: CPT | Mod: PBBFAC,,, | Performed by: PODIATRIST

## 2025-01-06 RX ORDER — TRAMADOL HYDROCHLORIDE 50 MG/1
50 TABLET ORAL
COMMUNITY
Start: 2024-12-28

## 2025-01-06 RX ORDER — IBUPROFEN 100 MG/5ML
1 SUSPENSION, ORAL (FINAL DOSE FORM) ORAL DAILY
COMMUNITY

## 2025-01-06 RX ORDER — AMMONIUM LACTATE 12 G/100G
1 CREAM TOPICAL 2 TIMES DAILY
Qty: 140 G | Refills: 11 | Status: SHIPPED | OUTPATIENT
Start: 2025-01-06

## 2025-01-06 RX ORDER — GABAPENTIN 300 MG/1
1 CAPSULE ORAL 3 TIMES DAILY
COMMUNITY
Start: 2024-03-26

## 2025-01-06 RX ORDER — LOSARTAN POTASSIUM 50 MG/1
50 TABLET ORAL
COMMUNITY
Start: 2024-12-04

## 2025-01-06 RX ORDER — DIAZEPAM 10 MG/1
10 TABLET ORAL DAILY PRN
COMMUNITY
Start: 2024-11-19

## 2025-01-06 RX ORDER — VIT C/E/ZN/COPPR/LUTEIN/ZEAXAN 250MG-90MG
CAPSULE ORAL
COMMUNITY

## 2025-01-11 NOTE — PROGRESS NOTES
Subjective:      Patient ID: Jarrett Butler is a 69 y.o. male.    Chief Complaint:   Nail Care, Diabetes Mellitus (Pcp-Maikel Brewer Jr., MD), and Callouses (Ball of left foot )    Jarrett Skaggs is a 69 y.o. male who presents to the clinic complaining of painful ingrown toenail on both feet.    Review of Systems   Constitutional: Negative for chills, decreased appetite, fever and malaise/fatigue.   HENT:  Negative for congestion, hearing loss, hoarse voice and tinnitus.    Eyes:  Negative for blurred vision, double vision, pain and visual disturbance.   Cardiovascular:  Positive for claudication. Negative for chest pain, cyanosis, palpitations and syncope.   Respiratory:  Negative for hemoptysis and shortness of breath.    Endocrine: Negative for cold intolerance and heat intolerance.   Hematologic/Lymphatic: Negative for adenopathy and bleeding problem.   Skin:  Positive for color change, dry skin, nail changes and poor wound healing.   Musculoskeletal:  Positive for arthritis, joint pain and stiffness. Negative for myalgias.   Gastrointestinal:  Negative for bloating, change in bowel habit, nausea and vomiting.   Genitourinary:  Negative for dysuria, flank pain and hematuria.   Neurological:  Positive for disturbances in coordination, loss of balance and sensory change. Negative for numbness.   Psychiatric/Behavioral:  Negative for altered mental status, hallucinations, memory loss and suicidal ideas.    Allergic/Immunologic: Negative for environmental allergies and persistent infections.           Objective:      Physical Exam  Vitals reviewed.   Constitutional:       Appearance: He is well-developed.   HENT:      Head: Normocephalic and atraumatic.   Cardiovascular:      Pulses:           Dorsalis pedis pulses are 0 on the right side and 0 on the left side.        Posterior tibial pulses are 1+ on the right side and 1+ on the left side.   Pulmonary:      Effort: Pulmonary effort is normal.    Musculoskeletal:         General: Normal range of motion.      Comments: Anterior, lateral, and posterior muscle groups bilateral lower extremities show strength 4 over 5 symmetrically. Inspection and palpation of the joints and bones reveal no crepitus or joint effusion. No tenderness upon palpation. Mild plantar flexor contractures noted to digits 2 through 5 bilaterally.  Angle and base of gait are normal.   Feet:      Right foot:      Skin integrity: Callus and dry skin present.      Left foot:      Skin integrity: Callus and dry skin present.   Skin:     General: Skin is warm and dry.      Capillary Refill: Capillary refill takes 2 to 3 seconds.      Coloration: Skin is pale.      Comments: Skin turgor is decreased bilaterally. Skin texture is thin dry and atrophic. Nail plates 1 through 5 bilaterally show thickening discoloration subungual debris and tenderness upon palpation. No lesions or rashes or open wounds appreciated both lower extremities on palpation and examination.   Neurological:      Mental Status: He is alert and oriented to person, place, and time.      Sensory: Sensory deficit present.      Motor: Atrophy present.      Deep Tendon Reflexes: Reflexes abnormal.      Reflex Scores:       Patellar reflexes are 1+ on the right side and 1+ on the left side.       Achilles reflexes are 1+ on the right side and 1+ on the left side.     Comments: Sharp, dull, light touch sensation are intact bilaterally.  Proprioceptive sensation is intact to both lower extremities.  Vibratory sensation and [place] Ching monofilament exam shows intact protective sensation to plantar toes 1 through 5 bilaterally. Deep tendon reflexes to the patellar tendons is 1 over 4 bilaterally symmetrical.  Deep tendon reflexes to the Achilles tendon is 1 over 4 bilaterally symmetrical. No ankle clonus or Babinski reflex noted bilaterally. Coordination is fair to both lower extremities.     Psychiatric:         Behavior:  "Behavior normal.               Assessment:       Encounter Diagnoses   Name Primary?    Psoriatic arthritis Yes    Disease of nail     Pain in both feet     PAD (peripheral artery disease)     Onychomycosis     Corn or callus      Independent visualization of imaging was performed.  Results were reviewed in detail with patient.       Plan:       Jarrett Ornelas" was seen today for nail care, diabetes mellitus and callouses.    Diagnoses and all orders for this visit:    Psoriatic arthritis    Disease of nail    Pain in both feet    PAD (peripheral artery disease)    Onychomycosis    Corn or callus    Other orders  -     ammonium lactate 12 % Crea; Apply 1 application  topically 2 (two) times daily.      I counseled the patient on his conditions, their implications and medical management.    The nature of the condition, options for management, as well as potential risks and complications were discussed in detail with patient. Patient was amenable to my recommendations and left my office fully informed and will follow up as instructed or sooner if necessary.      Decision making:  Chronic illnesses discussed in detail, previous records/notes and imaging independently reviewed, prescription drug management performed in addition to lengthy discussion regarding both conservative and surgical treatment options.    Routine Foot Care    Performed by:  Rufus Lynch. DPM  Authorized by:  Patient     Consent Done?:  Yes (Verbal)     Nail Care Type:  Debride  Location(s): All  (Left 1st Toe, Left 3rd Toe, Left 2nd Toe, Left 4th Toe, Left 5th Toe, Right 1st Toe, Right 2nd Toe, Right 3rd Toe, Right 4th Toe and Right 5th Toe)  Patient tolerance:  Patient tolerated the procedure well with no immediate complications     With patient's permission, the toenails mentioned above were aggressively reduced and debrided using a nail nipper, removing all offending nail and debris. The patient will continue to monitor the areas " daily, inspect the feet, wear protective shoe gear when ambulatory, and moisturizer to maintain skin integrity.      Callus Care Type: Debride    With patient's permission, the calluses/hyperkeratotic lesions mentioned above were aggressively reduced and debrided using a number 15 blade. The patient will continue to monitor the areas daily, inspect the feet, wear protective shoe gear when ambulatory, and moisturizer to maintain skin integrity.     Temporary Nail Removal     Performed by:  Rufus Lynch DPM     Consent Done?:  Yes (Written)     Location:  Bilateral hallux nails    Anesthesia:  Digital block  Local anesthetic: 0.5% Marcaine without epinephrine  Anesthetic total (ml):  4  Preparation:  Skin prepped with alcohol and skin prepped with Betadine     Amount removed:  Total nail avulsion  Wedge excision of skin of nail fold: No    Nail bed sutured?: No    Nail matrix removed:  No  Removed nail replaced and anchored: No    Dressing applied:  4x4, antibiotic ointment and dressing applied  Patient tolerance:  Patient tolerated the procedure well with no immediate complications    Digital nerve block applied to the above-mentioned toe. Toe was prepped and draped in a sterile fashion and penrose drain applied. Anesthesia was confirmed and the offending portion of nail plate was freed from the nail bed and eponychium, and was then excised. The surgical site was then lavaged with copious amounts of 70% isopropyl alcohol. Penrose drain was removed and betadine ointment and dry sterile dressing applied. Post-op care instructions were discussed and dispensed to patient.         Shoe inspection and foot education discussed in detail. Patient reminded of the importance of good nutrition, and proper foot hygeine. We discussed wearing proper shoe gear and to contact our office with any new questions or concerns.    Return to clinic in 3-6 months or sooner if problems arise

## 2025-01-30 DIAGNOSIS — M47.817 LUMBOSACRAL SPONDYLOSIS WITHOUT MYELOPATHY: Primary | ICD-10-CM

## 2025-01-30 DIAGNOSIS — M48.061 STENOSIS, SPINAL, LUMBAR: Primary | ICD-10-CM

## 2025-01-30 DIAGNOSIS — M47.812 CERVICAL OSTEOARTHRITIS: Primary | ICD-10-CM

## 2025-02-11 ENCOUNTER — HOSPITAL ENCOUNTER (OUTPATIENT)
Dept: RADIOLOGY | Facility: HOSPITAL | Age: 70
Discharge: HOME | End: 2025-02-11
Attending: PAIN MEDICINE
Payer: MEDICARE

## 2025-02-11 DIAGNOSIS — M48.061 STENOSIS, SPINAL, LUMBAR: ICD-10-CM

## 2025-02-11 DIAGNOSIS — M47.812 CERVICAL OSTEOARTHRITIS: ICD-10-CM

## 2025-02-11 PROCEDURE — 72050 X-RAY EXAM NECK SPINE 4/5VWS: CPT | Mod: TC

## 2025-02-11 PROCEDURE — 72114 X-RAY EXAM L-S SPINE BENDING: CPT | Mod: TC

## 2025-02-11 PROCEDURE — 72114 X-RAY EXAM L-S SPINE BENDING: CPT | Mod: 26,,, | Performed by: RADIOLOGY

## 2025-02-11 PROCEDURE — 72050 X-RAY EXAM NECK SPINE 4/5VWS: CPT | Mod: 26,,, | Performed by: RADIOLOGY

## 2025-03-06 ENCOUNTER — HOSPITAL ENCOUNTER (OUTPATIENT)
Dept: RADIOLOGY | Facility: HOSPITAL | Age: 70
Discharge: HOME | End: 2025-03-06
Attending: PAIN MEDICINE
Payer: MEDICARE

## 2025-03-06 DIAGNOSIS — M47.817 LUMBOSACRAL SPONDYLOSIS WITHOUT MYELOPATHY: ICD-10-CM

## 2025-03-06 DIAGNOSIS — M47.812 CERVICAL OSTEOARTHRITIS: ICD-10-CM

## 2025-03-06 PROCEDURE — 72141 MRI NECK SPINE W/O DYE: CPT | Mod: TC

## 2025-03-06 PROCEDURE — 72148 MRI LUMBAR SPINE W/O DYE: CPT | Mod: 26,,, | Performed by: RADIOLOGY

## 2025-03-06 PROCEDURE — 72141 MRI NECK SPINE W/O DYE: CPT | Mod: 26,,, | Performed by: RADIOLOGY

## 2025-03-06 PROCEDURE — 72148 MRI LUMBAR SPINE W/O DYE: CPT | Mod: TC

## 2025-06-23 ENCOUNTER — TELEPHONE (OUTPATIENT)
Dept: PODIATRY | Facility: CLINIC | Age: 70
End: 2025-06-23
Payer: MEDICARE

## 2025-07-03 ENCOUNTER — OFFICE VISIT (OUTPATIENT)
Dept: PODIATRY | Facility: CLINIC | Age: 70
End: 2025-07-03
Payer: MEDICARE

## 2025-07-03 VITALS
HEART RATE: 69 BPM | WEIGHT: 181.13 LBS | DIASTOLIC BLOOD PRESSURE: 80 MMHG | SYSTOLIC BLOOD PRESSURE: 156 MMHG | BODY MASS INDEX: 26.83 KG/M2 | HEIGHT: 69 IN

## 2025-07-03 DIAGNOSIS — B35.1 ONYCHOMYCOSIS: ICD-10-CM

## 2025-07-03 DIAGNOSIS — I73.9 PAD (PERIPHERAL ARTERY DISEASE): Primary | ICD-10-CM

## 2025-07-03 DIAGNOSIS — Q82.8 POROKERATOSIS: ICD-10-CM

## 2025-07-03 DIAGNOSIS — L84 CORN OR CALLUS: ICD-10-CM

## 2025-07-03 PROCEDURE — 99213 OFFICE O/P EST LOW 20 MIN: CPT | Mod: 25,S$PBB,, | Performed by: PODIATRIST

## 2025-07-03 PROCEDURE — 17110 DESTRUCTION B9 LES UP TO 14: CPT | Mod: GZ,S$PBB,, | Performed by: PODIATRIST

## 2025-07-03 PROCEDURE — 17110 DESTRUCTION B9 LES UP TO 14: CPT | Mod: PBBFAC | Performed by: PODIATRIST

## 2025-07-03 PROCEDURE — 11056 PARNG/CUTG B9 HYPRKR LES 2-4: CPT | Mod: PBBFAC | Performed by: PODIATRIST

## 2025-07-03 PROCEDURE — 99213 OFFICE O/P EST LOW 20 MIN: CPT | Mod: PBBFAC,25 | Performed by: PODIATRIST

## 2025-07-03 PROCEDURE — 11721 DEBRIDE NAIL 6 OR MORE: CPT | Mod: PBBFAC | Performed by: PODIATRIST

## 2025-07-03 PROCEDURE — 11721 DEBRIDE NAIL 6 OR MORE: CPT | Mod: XS,Q8,S$PBB, | Performed by: PODIATRIST

## 2025-07-03 PROCEDURE — 99999 PR PBB SHADOW E&M-EST. PATIENT-LVL III: CPT | Mod: PBBFAC,,, | Performed by: PODIATRIST

## 2025-07-03 PROCEDURE — 11056 PARNG/CUTG B9 HYPRKR LES 2-4: CPT | Mod: XS,Q8,S$PBB, | Performed by: PODIATRIST

## 2025-07-03 RX ORDER — CLOTRIMAZOLE AND BETAMETHASONE DIPROPIONATE 10; .64 MG/G; MG/G
CREAM TOPICAL 2 TIMES DAILY
Qty: 45 G | Refills: 1 | Status: SHIPPED | OUTPATIENT
Start: 2025-07-03

## 2025-07-03 RX ORDER — HYDROCODONE BITARTRATE AND ACETAMINOPHEN 10; 325 MG/1; MG/1
TABLET ORAL
COMMUNITY
Start: 2025-05-05

## 2025-07-10 NOTE — PROGRESS NOTES
Subjective:      Patient ID: Jarrett Butler is a 70 y.o. male.    Chief Complaint:   Nail Care and Callouses (L foot  )    Jarrett Skaggs is a 70 y.o. male who presents to the clinic complaining of painful ingrown toenail on both feet.  In addition he has a painful lesion to the bottoms of both feet.  History of PID.    Review of Systems   Constitutional: Negative for chills, decreased appetite, fever and malaise/fatigue.   HENT:  Negative for congestion, hearing loss, hoarse voice and tinnitus.    Eyes:  Negative for blurred vision, double vision, pain and visual disturbance.   Cardiovascular:  Positive for claudication. Negative for chest pain, cyanosis, palpitations and syncope.   Respiratory:  Negative for hemoptysis and shortness of breath.    Endocrine: Negative for cold intolerance and heat intolerance.   Hematologic/Lymphatic: Negative for adenopathy and bleeding problem.   Skin:  Positive for color change, dry skin, nail changes and poor wound healing.   Musculoskeletal:  Positive for arthritis, joint pain and stiffness. Negative for myalgias.   Gastrointestinal:  Negative for bloating, change in bowel habit, nausea and vomiting.   Genitourinary:  Negative for dysuria, flank pain and hematuria.   Neurological:  Positive for disturbances in coordination, loss of balance and sensory change. Negative for numbness.   Psychiatric/Behavioral:  Negative for altered mental status, hallucinations, memory loss and suicidal ideas.    Allergic/Immunologic: Negative for environmental allergies and persistent infections.           Objective:      Physical Exam  Vitals reviewed.   Constitutional:       Appearance: He is well-developed.   HENT:      Head: Normocephalic and atraumatic.   Cardiovascular:      Pulses:           Dorsalis pedis pulses are 0 on the right side and 0 on the left side.        Posterior tibial pulses are 1+ on the right side and 1+ on the left side.   Pulmonary:      Effort: Pulmonary effort  is normal.   Musculoskeletal:         General: Normal range of motion.      Comments: Anterior, lateral, and posterior muscle groups bilateral lower extremities show strength 4 over 5 symmetrically. Inspection and palpation of the joints and bones reveal no crepitus or joint effusion. No tenderness upon palpation. Mild plantar flexor contractures noted to digits 2 through 5 bilaterally.  Angle and base of gait are normal.   Feet:      Right foot:      Skin integrity: Callus and dry skin present.      Left foot:      Skin integrity: Callus and dry skin present.   Skin:     General: Skin is warm and dry.      Capillary Refill: Capillary refill takes 2 to 3 seconds.      Coloration: Skin is pale.      Comments: Skin turgor is decreased bilaterally. Skin texture is thin dry and atrophic. Nail plates 1 through 5 bilaterally show thickening discoloration subungual debris and tenderness upon palpation.     Porokeratotic lesion noted to the plantar aspect of the sub 5th MPJ foot with hyperkeratotic tissue, central nucleated core, and moderate to severe discomfort/tenderness with direct palpation.     Neurological:      Mental Status: He is alert and oriented to person, place, and time.      Sensory: Sensory deficit present.      Motor: Atrophy present.      Deep Tendon Reflexes: Reflexes abnormal.      Reflex Scores:       Patellar reflexes are 1+ on the right side and 1+ on the left side.       Achilles reflexes are 1+ on the right side and 1+ on the left side.     Comments: Sharp, dull, light touch sensation are intact bilaterally.  Proprioceptive sensation is intact to both lower extremities.  Vibratory sensation and [place] Ching monofilament exam shows intact protective sensation to plantar toes 1 through 5 bilaterally. Deep tendon reflexes to the patellar tendons is 1 over 4 bilaterally symmetrical.  Deep tendon reflexes to the Achilles tendon is 1 over 4 bilaterally symmetrical. No ankle clonus or Babinski reflex  "noted bilaterally. Coordination is fair to both lower extremities.     Psychiatric:         Behavior: Behavior normal.               Assessment:       Encounter Diagnoses   Name Primary?    PAD (peripheral artery disease) Yes    Corn or callus     Onychomycosis     Porokeratosis      Independent visualization of imaging was performed.  Results were reviewed in detail with patient.       Plan:       Jarrett Ornelas" was seen today for nail care and callouses.    Diagnoses and all orders for this visit:    PAD (peripheral artery disease)    Corn or callus    Onychomycosis    Porokeratosis    Other orders  -     clotrimazole-betamethasone 1-0.05% (LOTRISONE) cream; Apply topically 2 (two) times daily.      I counseled the patient on his conditions, their implications and medical management.    The nature of the condition, options for management, as well as potential risks and complications were discussed in detail with patient. Patient was amenable to my recommendations and left my office fully informed and will follow up as instructed or sooner if necessary.      Decision making:  Chronic illnesses discussed in detail, previous records/notes and imaging independently reviewed, prescription drug management performed in addition to lengthy discussion regarding both conservative and surgical treatment options.    Routine Foot Care    Performed by:  Rufus Lynch. DPM  Authorized by:  Patient     Consent Done?:  Yes (Verbal)     Nail Care Type:  Debride  Location(s): All  (Left 1st Toe, Left 3rd Toe, Left 2nd Toe, Left 4th Toe, Left 5th Toe, Right 1st Toe, Right 2nd Toe, Right 3rd Toe, Right 4th Toe and Right 5th Toe)  Patient tolerance:  Patient tolerated the procedure well with no immediate complications     With patient's permission, the toenails mentioned above were aggressively reduced and debrided using a nail nipper, removing all offending nail and debris. The patient will continue to monitor the areas " daily, inspect the feet, wear protective shoe gear when ambulatory, and moisturizer to maintain skin integrity.      Callus Care Type: Debride    With patient's permission, the calluses/hyperkeratotic lesions mentioned above were aggressively reduced and debrided using a number 15 blade. The patient will continue to monitor the areas daily, inspect the feet, wear protective shoe gear when ambulatory, and moisturizer to maintain skin integrity.     Lesion Excision     Performed by: Rufus Lynch DPM  Authorized by: Patient     Consent Done?:  Yes (Verbal)     Care Type:  Debride/Excise  Location(s):  Bilateral plantar foot sub 5th MPJ  Patient tolerance:  Patient tolerated the procedure well with no immediate complications      With patient's permission, the lesion(s) mentioned above were aggressively reduced and debrided using a 15 blade, tissue nipper, and curette to remove all lesion and associated debris. Topical trichloroacetic acid applied with a sterile cover. The patient will continue to monitor the areas daily, inspect the feet, wear protective shoe gear when ambulatory, and moisturizer to maintain skin integrity.            Shoe inspection and foot education discussed in detail. Patient reminded of the importance of good nutrition, and proper foot hygeine. We discussed wearing proper shoe gear and to contact our office with any new questions or concerns.    Return to clinic in 3-6 months or sooner if problems arise